# Patient Record
Sex: MALE | Race: WHITE | NOT HISPANIC OR LATINO | URBAN - METROPOLITAN AREA
[De-identification: names, ages, dates, MRNs, and addresses within clinical notes are randomized per-mention and may not be internally consistent; named-entity substitution may affect disease eponyms.]

---

## 2023-09-27 ENCOUNTER — APPOINTMENT (OUTPATIENT)
Dept: URBAN - METROPOLITAN AREA CLINIC 244 | Age: 48
Setting detail: DERMATOLOGY
End: 2023-09-27

## 2023-09-27 DIAGNOSIS — L90.5 SCAR CONDITIONS AND FIBROSIS OF SKIN: ICD-10-CM

## 2023-09-27 PROCEDURE — 99202 OFFICE O/P NEW SF 15 MIN: CPT

## 2023-09-27 PROCEDURE — OTHER ADDITIONAL NOTES: OTHER

## 2023-09-27 PROCEDURE — OTHER PRESCRIPTION: OTHER

## 2023-09-27 PROCEDURE — OTHER COUNSELING: OTHER

## 2023-09-27 RX ORDER — MUPIROCIN 20 MG/G
OINTMENT TOPICAL
Qty: 22 | Refills: 0 | Status: ERX | COMMUNITY
Start: 2023-09-27

## 2023-09-27 ASSESSMENT — LOCATION ZONE DERM: LOCATION ZONE: LEG

## 2023-09-27 ASSESSMENT — LOCATION DETAILED DESCRIPTION DERM: LOCATION DETAILED: LEFT KNEE

## 2023-09-27 ASSESSMENT — LOCATION SIMPLE DESCRIPTION DERM: LOCATION SIMPLE: LEFT KNEE

## 2023-09-27 NOTE — PROCEDURE: ADDITIONAL NOTES
Additional Notes: Recommend Vaseline and avoid excessive sun exposure.\\nWill call or RTC in 1 year to refer out for laser treatment.
Render Risk Assessment In Note?: no
Detail Level: Simple

## 2024-11-15 ENCOUNTER — LAB ENCOUNTER (OUTPATIENT)
Dept: LAB | Facility: HOSPITAL | Age: 49
End: 2024-11-15
Attending: INTERNAL MEDICINE
Payer: COMMERCIAL

## 2024-11-15 DIAGNOSIS — E78.00 ELEVATED LDL CHOLESTEROL LEVEL: Primary | ICD-10-CM

## 2024-11-15 LAB
CHOLEST SERPL-MCNC: 282 MG/DL (ref ?–200)
FASTING PATIENT LIPID ANSWER: YES
HDLC SERPL-MCNC: 65 MG/DL (ref 40–59)
LDLC SERPL CALC-MCNC: 198 MG/DL (ref ?–100)
NONHDLC SERPL-MCNC: 217 MG/DL (ref ?–130)
TRIGL SERPL-MCNC: 110 MG/DL (ref 30–149)
VLDLC SERPL CALC-MCNC: 23 MG/DL (ref 0–30)

## 2024-11-15 PROCEDURE — 36415 COLL VENOUS BLD VENIPUNCTURE: CPT

## 2024-11-15 PROCEDURE — 80061 LIPID PANEL: CPT

## 2025-02-26 ENCOUNTER — LAB ENCOUNTER (OUTPATIENT)
Dept: LAB | Age: 50
End: 2025-02-26
Attending: STUDENT IN AN ORGANIZED HEALTH CARE EDUCATION/TRAINING PROGRAM
Payer: COMMERCIAL

## 2025-02-26 ENCOUNTER — OFFICE VISIT (OUTPATIENT)
Dept: INTERNAL MEDICINE CLINIC | Facility: CLINIC | Age: 50
End: 2025-02-26
Payer: COMMERCIAL

## 2025-02-26 VITALS
HEIGHT: 64.8 IN | DIASTOLIC BLOOD PRESSURE: 83 MMHG | BODY MASS INDEX: 28.66 KG/M2 | OXYGEN SATURATION: 96 % | WEIGHT: 172 LBS | RESPIRATION RATE: 16 BRPM | SYSTOLIC BLOOD PRESSURE: 133 MMHG | HEART RATE: 79 BPM

## 2025-02-26 DIAGNOSIS — E66.3 OVERWEIGHT (BMI 25.0-29.9): ICD-10-CM

## 2025-02-26 DIAGNOSIS — E78.2 MIXED HYPERLIPIDEMIA: ICD-10-CM

## 2025-02-26 DIAGNOSIS — E78.2 MIXED HYPERLIPIDEMIA: Primary | ICD-10-CM

## 2025-02-26 DIAGNOSIS — Z12.5 PROSTATE CANCER SCREENING: ICD-10-CM

## 2025-02-26 LAB
ALBUMIN SERPL-MCNC: 4.8 G/DL (ref 3.2–4.8)
ALBUMIN/GLOB SERPL: 1.7 {RATIO} (ref 1–2)
ALP LIVER SERPL-CCNC: 80 U/L
ALT SERPL-CCNC: 22 U/L
ANION GAP SERPL CALC-SCNC: 6 MMOL/L (ref 0–18)
AST SERPL-CCNC: 25 U/L (ref ?–34)
BASOPHILS # BLD AUTO: 0.04 X10(3) UL (ref 0–0.2)
BASOPHILS NFR BLD AUTO: 0.7 %
BILIRUB SERPL-MCNC: 0.5 MG/DL (ref 0.3–1.2)
BUN BLD-MCNC: 17 MG/DL (ref 9–23)
BUN/CREAT SERPL: 13.7 (ref 10–20)
CALCIUM BLD-MCNC: 9.7 MG/DL (ref 8.7–10.4)
CHLORIDE SERPL-SCNC: 104 MMOL/L (ref 98–112)
CHOLEST SERPL-MCNC: 300 MG/DL (ref ?–200)
CO2 SERPL-SCNC: 30 MMOL/L (ref 21–32)
COMPLEXED PSA SERPL-MCNC: 0.68 NG/ML (ref ?–4)
CREAT BLD-MCNC: 1.24 MG/DL
DEPRECATED RDW RBC AUTO: 39.5 FL (ref 35.1–46.3)
EGFRCR SERPLBLD CKD-EPI 2021: 70 ML/MIN/1.73M2 (ref 60–?)
EOSINOPHIL # BLD AUTO: 0.17 X10(3) UL (ref 0–0.7)
EOSINOPHIL NFR BLD AUTO: 3.1 %
ERYTHROCYTE [DISTWIDTH] IN BLOOD BY AUTOMATED COUNT: 12.1 % (ref 11–15)
EST. AVERAGE GLUCOSE BLD GHB EST-MCNC: 108 MG/DL (ref 68–126)
FASTING PATIENT LIPID ANSWER: NO
FASTING STATUS PATIENT QL REPORTED: NO
GLOBULIN PLAS-MCNC: 2.9 G/DL (ref 2–3.5)
GLUCOSE BLD-MCNC: 97 MG/DL (ref 70–99)
HBA1C MFR BLD: 5.4 % (ref ?–5.7)
HCT VFR BLD AUTO: 47.9 %
HDLC SERPL-MCNC: 66 MG/DL (ref 40–59)
HGB BLD-MCNC: 16.1 G/DL
IMM GRANULOCYTES # BLD AUTO: 0.01 X10(3) UL (ref 0–1)
IMM GRANULOCYTES NFR BLD: 0.2 %
LDLC SERPL CALC-MCNC: 212 MG/DL (ref ?–100)
LYMPHOCYTES # BLD AUTO: 1.59 X10(3) UL (ref 1–4)
LYMPHOCYTES NFR BLD AUTO: 29.3 %
MCH RBC QN AUTO: 29.9 PG (ref 26–34)
MCHC RBC AUTO-ENTMCNC: 33.6 G/DL (ref 31–37)
MCV RBC AUTO: 89 FL
MONOCYTES # BLD AUTO: 0.61 X10(3) UL (ref 0.1–1)
MONOCYTES NFR BLD AUTO: 11.3 %
NEUTROPHILS # BLD AUTO: 3 X10 (3) UL (ref 1.5–7.7)
NEUTROPHILS # BLD AUTO: 3 X10(3) UL (ref 1.5–7.7)
NEUTROPHILS NFR BLD AUTO: 55.4 %
NONHDLC SERPL-MCNC: 234 MG/DL (ref ?–130)
OSMOLALITY SERPL CALC.SUM OF ELEC: 291 MOSM/KG (ref 275–295)
PLATELET # BLD AUTO: 265 10(3)UL (ref 150–450)
POTASSIUM SERPL-SCNC: 4.5 MMOL/L (ref 3.5–5.1)
PROT SERPL-MCNC: 7.7 G/DL (ref 5.7–8.2)
RBC # BLD AUTO: 5.38 X10(6)UL
SODIUM SERPL-SCNC: 140 MMOL/L (ref 136–145)
TRIGL SERPL-MCNC: 123 MG/DL (ref 30–149)
TSI SER-ACNC: 4.47 UIU/ML (ref 0.55–4.78)
VLDLC SERPL CALC-MCNC: 27 MG/DL (ref 0–30)
WBC # BLD AUTO: 5.4 X10(3) UL (ref 4–11)

## 2025-02-26 PROCEDURE — 80053 COMPREHEN METABOLIC PANEL: CPT

## 2025-02-26 PROCEDURE — 85025 COMPLETE CBC W/AUTO DIFF WBC: CPT

## 2025-02-26 PROCEDURE — 83036 HEMOGLOBIN GLYCOSYLATED A1C: CPT | Performed by: STUDENT IN AN ORGANIZED HEALTH CARE EDUCATION/TRAINING PROGRAM

## 2025-02-26 PROCEDURE — 36415 COLL VENOUS BLD VENIPUNCTURE: CPT | Performed by: STUDENT IN AN ORGANIZED HEALTH CARE EDUCATION/TRAINING PROGRAM

## 2025-02-26 PROCEDURE — 3075F SYST BP GE 130 - 139MM HG: CPT | Performed by: STUDENT IN AN ORGANIZED HEALTH CARE EDUCATION/TRAINING PROGRAM

## 2025-02-26 PROCEDURE — 3008F BODY MASS INDEX DOCD: CPT | Performed by: STUDENT IN AN ORGANIZED HEALTH CARE EDUCATION/TRAINING PROGRAM

## 2025-02-26 PROCEDURE — 3079F DIAST BP 80-89 MM HG: CPT | Performed by: STUDENT IN AN ORGANIZED HEALTH CARE EDUCATION/TRAINING PROGRAM

## 2025-02-26 PROCEDURE — 80061 LIPID PANEL: CPT

## 2025-02-26 PROCEDURE — 99203 OFFICE O/P NEW LOW 30 MIN: CPT | Performed by: STUDENT IN AN ORGANIZED HEALTH CARE EDUCATION/TRAINING PROGRAM

## 2025-02-26 PROCEDURE — 84443 ASSAY THYROID STIM HORMONE: CPT

## 2025-02-26 RX ORDER — ERGOCALCIFEROL 1.25 MG/1
1 CAPSULE ORAL WEEKLY
COMMUNITY
Start: 2023-10-06

## 2025-02-26 RX ORDER — CARBAMAZEPINE 400 MG/1
400 TABLET, EXTENDED RELEASE ORAL 2 TIMES DAILY
COMMUNITY
Start: 2004-04-24

## 2025-02-27 ENCOUNTER — PATIENT MESSAGE (OUTPATIENT)
Dept: INTERNAL MEDICINE CLINIC | Facility: CLINIC | Age: 50
End: 2025-02-27

## 2025-02-27 NOTE — TELEPHONE ENCOUNTER
Please see patient reply      Hi Suzy     Your cholesterol has gone up compared to 3 months. My recommendation is to start a cholesterol medication asap. Please let me know if you'd like me to send it to your pharmacy. Your other labs are normal,     Dr. Darden   Written by Liane Darden MD on 2/27/2025  9:43 AM CST

## 2025-03-04 RX ORDER — ROSUVASTATIN CALCIUM 20 MG/1
20 TABLET, COATED ORAL NIGHTLY
Qty: 90 TABLET | Refills: 0 | Status: SHIPPED | OUTPATIENT
Start: 2025-03-04

## 2025-05-12 NOTE — TELEPHONE ENCOUNTER
Per last fill:    Sp Al[place]     Your cholesterol has gone up compared to 3 months. My recommendation is to start a cholesterol medication asap. Please let me know if you'd like me to send it to your pharmacy. Your other labs are normal,     Dr. Darden   Written by Liane Darden MD on 2/27/2025  9:43 AM CST    Is refill appropriate, Medication refill pended for your review/approval

## 2025-05-13 RX ORDER — ROSUVASTATIN CALCIUM 20 MG/1
20 TABLET, COATED ORAL NIGHTLY
Qty: 90 TABLET | Refills: 3 | Status: SHIPPED | OUTPATIENT
Start: 2025-05-13

## 2025-06-03 ENCOUNTER — OFFICE VISIT (OUTPATIENT)
Dept: INTERNAL MEDICINE CLINIC | Facility: CLINIC | Age: 50
End: 2025-06-03
Payer: COMMERCIAL

## 2025-06-03 VITALS
HEIGHT: 64.8 IN | OXYGEN SATURATION: 96 % | TEMPERATURE: 97 F | SYSTOLIC BLOOD PRESSURE: 136 MMHG | HEART RATE: 77 BPM | DIASTOLIC BLOOD PRESSURE: 95 MMHG | BODY MASS INDEX: 28.82 KG/M2 | RESPIRATION RATE: 18 BRPM | WEIGHT: 173 LBS

## 2025-06-03 DIAGNOSIS — N52.9 ERECTILE DYSFUNCTION, UNSPECIFIED ERECTILE DYSFUNCTION TYPE: ICD-10-CM

## 2025-06-03 DIAGNOSIS — Z12.11 COLON CANCER SCREENING: ICD-10-CM

## 2025-06-03 DIAGNOSIS — E66.3 OVERWEIGHT (BMI 25.0-29.9): ICD-10-CM

## 2025-06-03 DIAGNOSIS — Z00.00 HEALTH MAINTENANCE EXAMINATION: Primary | ICD-10-CM

## 2025-06-03 RX ORDER — TADALAFIL 10 MG/1
10 TABLET ORAL
Qty: 18 TABLET | Refills: 0 | Status: SHIPPED | OUTPATIENT
Start: 2025-06-03

## 2025-06-03 NOTE — PROGRESS NOTES
Subjective     Chief Complaint   Patient presents with    Physical     Annual Physical    Weight Loss     Discuss weight loss        History of Present Illness  Suzy Reeves is a 49 year old male who presents for an annual physical exam.    He is on Crestor for hyperlipidemia and carbamazepine for seizure control, with no changes in his medication regimen. He has been on carbamazepine for 39 years, which effectively controls his seizures.     He is actively working on weight management, facing challenges in losing weight despite reducing processed food intake and increasing physical activity. He recently went on a cruise, which made it difficult to maintain his diet. He currently weighs 173 pounds and aims to reach 160 pounds, feeling frustrated with his inability to lose weight despite a perceived calorie deficit.    He has a family history of high cholesterol, but a recent DNA test for hereditary cholesterol issues was negative, which he found reassuring for his daughter. There is no significant family history of other conditions.    He was recently started on crestor 20mg. He has seen cardiology who repeated his lipid panel and per patient, cholesterol is at goal.     He consumes alcohol moderately, with two to three drinks per week, and does not smoke or use drugs.    In terms of preventive care, he completed a Cologuard test last year, which was negative. He has no history of colon cancer or related issues. He received a flu shot in the fall and is considering a pneumonia vaccine today.    He is experiencing issues with the timing of his current erectile dysfunction medication, Viagra, and is interested in exploring other options that may have a quicker onset of action.    Results      Past Medical History[1]    Past Surgical History[2]    Allergies[3]    Family History[4]    Social Hx on file[5]      I have personally reviewed and updated the following EMR sections as appropriate: Current medications,  Allergies, Problem list, Past Medical History, Past Surgical History, Social History and Family History    Review of Systems   Constitutional:  Negative for chills and fever.   Respiratory:  Negative for cough and shortness of breath.    Cardiovascular:  Negative for chest pain.   Gastrointestinal:  Negative for abdominal pain and diarrhea.   Endocrine: Negative for polydipsia and polyuria.   Musculoskeletal:  Negative for joint swelling.   Neurological:  Negative for dizziness and light-headedness.       Objective     Medications Ordered Prior to Encounter[6]  BP (!) 136/95 (BP Location: Right arm, Patient Position: Sitting, Cuff Size: adult)   Pulse 77   Temp 97.3 °F (36.3 °C) (Temporal)   Resp 18   Ht 5' 4.8\" (1.646 m)   Wt 173 lb (78.5 kg)   SpO2 96%   BMI 28.97 kg/m²   Physical Exam  Vitals reviewed.   Constitutional:       Appearance: Normal appearance.   HENT:      Head: Normocephalic and atraumatic.   Cardiovascular:      Rate and Rhythm: Normal rate and regular rhythm.   Pulmonary:      Effort: Pulmonary effort is normal.      Breath sounds: Normal breath sounds.   Abdominal:      General: Abdomen is flat. There is no distension.      Tenderness: There is no abdominal tenderness.   Skin:     General: Skin is warm and dry.   Neurological:      General: No focal deficit present.      Mental Status: He is alert and oriented to person, place, and time.   Psychiatric:         Mood and Affect: Mood normal.         Behavior: Behavior normal.         Recent Results (from the past 14 weeks)   Hemoglobin A1C    Collection Time: 02/26/25  9:58 AM   Result Value Ref Range    HgbA1C 5.4 <5.7 %    Estimated Average Glucose 108 68 - 126 mg/dL   CBC With Differential With Platelet    Collection Time: 02/26/25  9:58 AM   Result Value Ref Range    WBC 5.4 4.0 - 11.0 x10(3) uL    RBC 5.38 4.30 - 5.70 x10(6)uL    HGB 16.1 13.0 - 17.5 g/dL    HCT 47.9 39.0 - 53.0 %    MCV 89.0 80.0 - 100.0 fL    MCH 29.9 26.0 - 34.0 pg     MCHC 33.6 31.0 - 37.0 g/dL    RDW-SD 39.5 35.1 - 46.3 fL    RDW 12.1 11.0 - 15.0 %    .0 150.0 - 450.0 10(3)uL    Neutrophil Absolute Prelim 3.00 1.50 - 7.70 x10 (3) uL    Neutrophil Absolute 3.00 1.50 - 7.70 x10(3) uL    Lymphocyte Absolute 1.59 1.00 - 4.00 x10(3) uL    Monocyte Absolute 0.61 0.10 - 1.00 x10(3) uL    Eosinophil Absolute 0.17 0.00 - 0.70 x10(3) uL    Basophil Absolute 0.04 0.00 - 0.20 x10(3) uL    Immature Granulocyte Absolute 0.01 0.00 - 1.00 x10(3) uL    Neutrophil % 55.4 %    Lymphocyte % 29.3 %    Monocyte % 11.3 %    Eosinophil % 3.1 %    Basophil % 0.7 %    Immature Granulocyte % 0.2 %   Comp Metabolic Panel (14)    Collection Time: 02/26/25  9:58 AM   Result Value Ref Range    Glucose 97 70 - 99 mg/dL    Sodium 140 136 - 145 mmol/L    Potassium 4.5 3.5 - 5.1 mmol/L    Chloride 104 98 - 112 mmol/L    CO2 30.0 21.0 - 32.0 mmol/L    Anion Gap 6 0 - 18 mmol/L    BUN 17 9 - 23 mg/dL    Creatinine 1.24 0.70 - 1.30 mg/dL    BUN/CREA Ratio 13.7 10.0 - 20.0    Calcium, Total 9.7 8.7 - 10.4 mg/dL    Calculated Osmolality 291 275 - 295 mOsm/kg    eGFR-Cr 70 >=60 mL/min/1.73m2    ALT 22 10 - 49 U/L    AST 25 <34 U/L    Alkaline Phosphatase 80 45 - 117 U/L    Bilirubin, Total 0.5 0.3 - 1.2 mg/dL    Total Protein 7.7 5.7 - 8.2 g/dL    Albumin 4.8 3.2 - 4.8 g/dL    Globulin  2.9 2.0 - 3.5 g/dL    A/G Ratio 1.7 1.0 - 2.0    Patient Fasting for CMP? No    Lipid Panel    Collection Time: 02/26/25  9:58 AM   Result Value Ref Range    Cholesterol, Total 300 (H) <200 mg/dL    HDL Cholesterol 66 (H) 40 - 59 mg/dL    Triglycerides 123 30 - 149 mg/dL    LDL Cholesterol 212 (H) <100 mg/dL    VLDL 27 0 - 30 mg/dL    Non HDL Chol 234 (H) <130 mg/dL    Patient Fasting for Lipid? No    TSH W Reflex To Free T4    Collection Time: 02/26/25  9:58 AM   Result Value Ref Range    TSH 4.474 0.550 - 4.780 uIU/mL   PSA (Screening) [E]    Collection Time: 02/26/25  9:58 AM   Result Value Ref Range    Prostate Specific  Antigen Screen  0.68 <=4.00 ng/mL       Assessment and Plan      Health maintenance examination (Primary)  Assessment & Plan:  Health maintenance for male>50:  -Colorectal cancer screening with colonoscopy ordered.   Vaccinations:  -Td or Tdap. Patient will bring records to his next visit.   -Prevnar 20 06/2025.   Colon cancer screening  -     Gastro Referral - In Network  Overweight (BMI 25.0-29.9)  -     Semaglutide-Weight Management; Inject 0.5 mL (0.25 mg total) into the skin once a week for 4 doses.  Dispense: 2 mL; Refill: 0  Erectile dysfunction, unspecified erectile dysfunction type  -     Tadalafil; Take 1 tablet (10 mg total) by mouth daily as needed for Erectile Dysfunction.  Dispense: 18 tablet; Refill: 0  Other orders  -     Prevnar 20 (PCV20) [34913]      Assessment & Plan  Mixed Hyperlipidemia  Crestor effectively manages cholesterol. Genetic testing for familial hypercholesterolemia was negative.   - Request recent cholesterol levels from cardiologist.  - Continue Crestor as prescribed.    Overweight (BMI 25.0-29.9)  - Associated with HLD and HTN.   - Prescribe Wegovy 0.25 mg via mail order pharmacy, pending prior authorization.  Discussed obesity, risks, pathogenesis in full.    Discussed different weight loss medications, in addition to continued diet and exercise.   Prescribed wegovy. Benefit, MOA and possible side effects were discussed in full, including but not limited to constipation, diarrhea, nausea, vomiting, indigestion, metallic taste, hair loss, pancreatitis and medullary thyroid carcinoma.   Recommended taking first thing in the morning, on an empty stomach, waiting 5-6 hours after taking the medication before eating.   Recommended avoiding carbs, food that is high in fat.   Recommended increasing fiber intake, increasing protein intake in addition to weight-bearing exercises.       Seizure Disorder  Seizures well-managed with carbamazepine for 39 years.    Erectile Dysfunction  -no  adequate response with viagra.   - Prescribe Cialis 10 mg, pending prior authorization.    General Health Maintenance  Due for tetanus booster and pneumonia vaccine. Negative Cologuard test last year; advised colonoscopy for accurate screening.  - Administer pneumonia vaccine today.  - Referral to gastroenterologist for colonoscopy.  - Check records for tetanus booster and provide information at next visit.       Return in about 4 weeks (around 7/1/2025).    Liane Darden MD  Internal Medicine  6/3/2025       [1]   Past Medical History:   Essential hypertension    Hyperlipidemia   [2] History reviewed. No pertinent surgical history.  [3] No Known Allergies  [4] History reviewed. No pertinent family history.  [5]   Social History  Socioeconomic History    Marital status:    Tobacco Use    Smoking status: Never    Smokeless tobacco: Never   Vaping Use    Vaping status: Never Used   Substance and Sexual Activity    Alcohol use: Yes    Drug use: Not Currently    Sexual activity: Yes   [6]   Current Outpatient Medications on File Prior to Visit   Medication Sig Dispense Refill    rosuvastatin 20 MG Oral Tab Take 1 tablet (20 mg total) by mouth nightly. 90 tablet 3    carBAMazepine  MG Oral Tablet 12 Hr Take 1 tablet (400 mg total) by mouth 2 (two) times daily.      ergocalciferol 1.25 MG (77657 UT) Oral Cap Take 1 capsule (50,000 Units total) by mouth once a week.       No current facility-administered medications on file prior to visit.

## 2025-06-03 NOTE — ASSESSMENT & PLAN NOTE
Health maintenance for male>50:  -Colorectal cancer screening with colonoscopy ordered.   Vaccinations:  -Td or Tdap. Patient will bring records to his next visit.   -Prevnar 20 06/2025.

## 2025-06-03 NOTE — PROGRESS NOTES
The following individual(s) verbally consented to be recorded using ambient AI listening technology and understand that they can each withdraw their consent to this listening technology at any point by asking the clinician to turn off or pause the recording:YES    Patient name: Suzy Lala  Additional names:

## 2025-06-05 ENCOUNTER — TELEPHONE (OUTPATIENT)
Dept: INTERNAL MEDICINE CLINIC | Facility: CLINIC | Age: 50
End: 2025-06-05

## 2025-06-05 NOTE — TELEPHONE ENCOUNTER
Per prior auth for Wegovy: Is documentation being provided to confirm that at baseline, the patient had, or the patient currently has, at least TWO of the following weight-related comorbidities: hypertension, type 2 diabetes, dyslipidemia, obstructive sleep apnea, cardiovascular disease, knee osteoarthritis, asthma, chronic obstructive pulmonary disease, metabolic-dysfunction associated steatotic liver disease/non-alcoholic fatty liver disease, polycystic ovarian syndrome, or coronary artery disease?      Per patient history, previous diagnosis of hyperlipidemia and hypertension.   Please update 6/3/25 chart notes to include both diagnoses to submit with prior auth if appropriate.

## 2025-06-06 NOTE — TELEPHONE ENCOUNTER
Sent the medication to the patients [facility] due to [facility] not covering for one month.

## 2025-06-26 ENCOUNTER — OFFICE VISIT (OUTPATIENT)
Dept: INTERNAL MEDICINE CLINIC | Facility: CLINIC | Age: 50
End: 2025-06-26
Payer: COMMERCIAL

## 2025-06-26 VITALS
WEIGHT: 169.38 LBS | RESPIRATION RATE: 18 BRPM | TEMPERATURE: 97 F | OXYGEN SATURATION: 100 % | HEART RATE: 79 BPM | DIASTOLIC BLOOD PRESSURE: 83 MMHG | SYSTOLIC BLOOD PRESSURE: 149 MMHG | BODY MASS INDEX: 28.22 KG/M2 | HEIGHT: 64.8 IN

## 2025-06-26 DIAGNOSIS — E66.3 OVERWEIGHT (BMI 25.0-29.9): Primary | ICD-10-CM

## 2025-06-26 PROCEDURE — 99214 OFFICE O/P EST MOD 30 MIN: CPT | Performed by: STUDENT IN AN ORGANIZED HEALTH CARE EDUCATION/TRAINING PROGRAM

## 2025-06-26 PROCEDURE — 3008F BODY MASS INDEX DOCD: CPT | Performed by: STUDENT IN AN ORGANIZED HEALTH CARE EDUCATION/TRAINING PROGRAM

## 2025-06-26 PROCEDURE — 3079F DIAST BP 80-89 MM HG: CPT | Performed by: STUDENT IN AN ORGANIZED HEALTH CARE EDUCATION/TRAINING PROGRAM

## 2025-06-26 PROCEDURE — 3077F SYST BP >= 140 MM HG: CPT | Performed by: STUDENT IN AN ORGANIZED HEALTH CARE EDUCATION/TRAINING PROGRAM

## 2025-06-26 NOTE — PROGRESS NOTES
Subjective     Chief Complaint   Patient presents with    Follow - Up     3 week follow-up    Medication Follow-Up       History of Present Illness  Suzy Reeves is a 49 year old male who presents for a follow-up on Wegovy treatment for weight management.    He is currently on Wegovy at a dose of 0.25 mg, which he has been taking for three weeks. Initial nausea was experienced on the first night of administration, but no significant side effects have occurred since. He feels that the medication is curbing his appetite, although he is unsure if this effect is psychological. He has lost four pounds in less than a month, which is within a healthy range of weight loss. His goal weight is 155 pounds, aiming for a 10% reduction from his current weight.    Yoga has been slightly more challenging, particularly with balance exercises, but he is unsure if this is related to the medication. He is actively monitoring his body composition using an InBody scale, noting a decrease in fat mass with minimal muscle loss. He is ensuring adequate protein intake and engaging in strength exercises to prevent muscle mass loss.    His blood pressure is consistently high during office visits but normal at home, attributing this to 'white coat syndrome'. He confirms regular monitoring of his blood pressure at home.    No major side effects from Wegovy aside from initial nausea. No significant issues affecting his daily life.    Results        Past Medical History[1]    Past Surgical History[2]    Allergies[3]    Family History[4]    Social Hx on file[5]      I have personally reviewed and updated the following EMR sections as appropriate: Current medications, Allergies, Problem list, Past Medical History, Past Surgical History, Social History and Family History    Review of Systems   Constitutional: Negative.    Respiratory: Negative.     Cardiovascular: Negative.    Gastrointestinal: Negative.    Skin: Negative.    Neurological:  Negative.        Objective     Medications Ordered Prior to Encounter[6]  /83 (BP Location: Right arm, Patient Position: Sitting, Cuff Size: adult)   Pulse 79   Temp 97.1 °F (36.2 °C) (Temporal)   Resp 18   Ht 5' 4.8\" (1.646 m)   Wt 169 lb 6.4 oz (76.8 kg)   SpO2 100%   BMI 28.36 kg/m²   Physical Exam  Vitals reviewed.   Constitutional:       Appearance: Normal appearance.   HENT:      Head: Normocephalic and atraumatic.   Skin:     General: Skin is warm and dry.   Neurological:      General: No focal deficit present.      Mental Status: He is alert and oriented to person, place, and time.   Psychiatric:         Mood and Affect: Mood normal.         Behavior: Behavior normal.         No results found for this or any previous visit (from the past 14 weeks).    Assessment and Plan      Overweight (BMI 25.0-29.9) (Primary)  -     Semaglutide-Weight Management; Inject 0.5 mL (0.5 mg total) into the skin once a week for 12 doses.  Dispense: 6.5 mL; Refill: 0      Assessment & Plan  Obesity  Currently on Wegovy 0.25 mg with mild initial nausea. Lost four pounds in three weeks. Goal weight is 155-160 pounds. Discussed hydration, protein intake, and strength exercises.  - Increase Wegovy to 0.5 mg for three months via [facility].  - Monitor for side effects and adjust dosing interval if necessary.  - Encourage hydration and protein intake.  - Advise on strength exercises to prevent muscle mass loss.    Hypertension  Elevated office blood pressure due to white coat syndrome. Normal readings at home. Discussed risk of kidney disease with prolonged borderline hypertension.  - Continue monitoring blood pressure at home.  - Educate on the importance of accurate home blood pressure monitoring.       Return in about 12 weeks (around 9/18/2025).    Liane Darden MD  Internal Medicine  6/26/2025       [1]   Past Medical History:   Essential hypertension    Hyperlipidemia   [2] History reviewed. No pertinent  surgical history.  [3] No Known Allergies  [4] History reviewed. No pertinent family history.  [5]   Social History  Socioeconomic History    Marital status:    Tobacco Use    Smoking status: Never    Smokeless tobacco: Never   Vaping Use    Vaping status: Never Used   Substance and Sexual Activity    Alcohol use: Yes    Drug use: Not Currently    Sexual activity: Yes   [6]   Current Outpatient Medications on File Prior to Visit   Medication Sig Dispense Refill    Tadalafil 10 MG Oral Tab Take 1 tablet (10 mg total) by mouth daily as needed for Erectile Dysfunction. 18 tablet 0    rosuvastatin 20 MG Oral Tab Take 1 tablet (20 mg total) by mouth nightly. 90 tablet 3    carBAMazepine  MG Oral Tablet 12 Hr Take 1 tablet (400 mg total) by mouth 2 (two) times daily.      ergocalciferol 1.25 MG (36587 UT) Oral Cap Take 1 capsule (50,000 Units total) by mouth once a week.       No current facility-administered medications on file prior to visit.

## 2025-06-29 ENCOUNTER — PATIENT MESSAGE (OUTPATIENT)
Dept: INTERNAL MEDICINE CLINIC | Facility: CLINIC | Age: 50
End: 2025-06-29

## 2025-06-29 DIAGNOSIS — E66.3 OVERWEIGHT (BMI 25.0-29.9): ICD-10-CM

## 2025-07-01 NOTE — TELEPHONE ENCOUNTER
The patient called regarding this medication - he said he would prefer it electronically sent to the [facility] in [place]. Pended for approval, Dr. Castro please advise, thank you.

## 2025-07-01 NOTE — TELEPHONE ENCOUNTER
Dr Salomon, patient is asking for script for semaglutide, stating he did not get the paper script.  Will you send to his [facility]?  Pended for review.    This RN called and left message asking patient to call back.

## 2025-07-02 NOTE — TELEPHONE ENCOUNTER
Notified through [facility] .       MEDICATION RECORD :  semaglutide-weight management 0.5 MG/0.5ML Subcutaneous Solution Auto-injector 6.5 mL 0 7/1/2025 9/17/2025    Sig - Route: Inject 0.5 mL (0.5 mg total) into the skin once a week for 12 doses. - Subcutaneous    Sent to pharmacy as: Semaglutide-Weight Management 0.5 MG/0.5ML Subcutaneous Solution Auto-injector (Wegovy)    E-Prescribing Status: Receipt confirmed by pharmacy (7/1/2025  5:27 PM CDT)      Associated Diagnoses    Overweight (BMI 25.0-29.9)        Pharmacy    [facility] #[id number] - [place], [place] - [address] AT [facility] ([place], [phone], [phone]

## 2025-07-24 DIAGNOSIS — N52.9 ERECTILE DYSFUNCTION, UNSPECIFIED ERECTILE DYSFUNCTION TYPE: ICD-10-CM

## 2025-07-25 RX ORDER — TADALAFIL 10 MG/1
10 TABLET ORAL
Qty: 18 TABLET | Refills: 7 | Status: SHIPPED | OUTPATIENT
Start: 2025-07-25